# Patient Record
Sex: FEMALE | Race: WHITE | NOT HISPANIC OR LATINO | Employment: OTHER | URBAN - METROPOLITAN AREA
[De-identification: names, ages, dates, MRNs, and addresses within clinical notes are randomized per-mention and may not be internally consistent; named-entity substitution may affect disease eponyms.]

---

## 2022-05-23 ENCOUNTER — OFFICE VISIT (OUTPATIENT)
Dept: GASTROENTEROLOGY | Facility: CLINIC | Age: 57
End: 2022-05-23

## 2022-05-23 VITALS
BODY MASS INDEX: 38.48 KG/M2 | DIASTOLIC BLOOD PRESSURE: 87 MMHG | WEIGHT: 225.4 LBS | HEIGHT: 64 IN | SYSTOLIC BLOOD PRESSURE: 212 MMHG | TEMPERATURE: 97.3 F | HEART RATE: 81 BPM

## 2022-05-23 DIAGNOSIS — R19.7 DIARRHEA, UNSPECIFIED TYPE: ICD-10-CM

## 2022-05-23 DIAGNOSIS — K75.81 NASH (NONALCOHOLIC STEATOHEPATITIS): ICD-10-CM

## 2022-05-23 DIAGNOSIS — K74.60 CIRRHOSIS OF LIVER WITHOUT ASCITES, UNSPECIFIED HEPATIC CIRRHOSIS TYPE: Primary | ICD-10-CM

## 2022-05-23 PROCEDURE — 99204 OFFICE O/P NEW MOD 45 MIN: CPT | Performed by: NURSE PRACTITIONER

## 2022-05-23 RX ORDER — BLOOD SUGAR DIAGNOSTIC
STRIP MISCELLANEOUS
COMMUNITY
Start: 2022-03-21

## 2022-05-23 RX ORDER — ALBUTEROL SULFATE 90 UG/1
2 AEROSOL, METERED RESPIRATORY (INHALATION)
COMMUNITY
Start: 2022-01-12

## 2022-05-23 RX ORDER — LANCETS 33 GAUGE
EACH MISCELLANEOUS
COMMUNITY
Start: 2022-03-23

## 2022-05-23 RX ORDER — BLOOD-GLUCOSE METER
EACH MISCELLANEOUS 3 TIMES DAILY
COMMUNITY
Start: 2022-04-13

## 2022-05-23 RX ORDER — SITAGLIPTIN 100 MG/1
100 TABLET, FILM COATED ORAL DAILY
COMMUNITY
Start: 2022-02-19

## 2022-05-23 NOTE — PROGRESS NOTES
GASTROENTEROLOGY OFFICE NOTE  Paige Gastelum  [id number]  1965    CARE TEAM  Patient Care Team:  Edith Herrera APRN as PCP - General (Family Medicine)    Referring Provider: Edith Herrera APRN    Chief Complaint   Patient presents with   • Hepatic Disease        HISTORY OF PRESENT ILLNESS:  Ms. Gastelum is a 56-year-old female who presents today to establish care with gastroenterology/hepatology being seen previously at  for STILES and recently diagnosed with cirrhosis.    I reviewed her medical record available through Care Everywhere from  and it looks like in 2021 she was evaluated for elevated liver enzymes, serologic testing for viral, genetic and autoimmune diseases were negative.  An ultrasound from July 2020 shows hepatic steatosis.  At some point, FibroScan testing was done and indicated cirrhosis.  From there she went on to have an EGD in September 2021 to assess for varices, EGD report reads possible early grade 1 esophageal varix.    She has no history of ascites.  She has questionable evidence of hepatic encephalopathy, she reports that for the past several months she has had some problems with her memory specifically, she will be driving in not realize where she is or remember where she is going.  She reorients very quickly.  She has an upcoming appointment with neurology regarding this.    She has a history of diarrhea that seems to be mostly postprandial.  She typically has 5-7 loose, urgent bowel movements daily.  Colonoscopy at  in September 2021 was unremarkable with the exception of 2 polyps removed.    PAST MEDICAL HISTORY  Past Medical History:   Diagnosis Date   • Fatty liver    • GERD (gastroesophageal reflux disease)    • Liver disease         PAST SURGICAL HISTORY  Past Surgical History:   Procedure Laterality Date   • CHOLECYSTECTOMY     • HERNIA REPAIR  2014   • HYSTERECTOMY  2001   • OOPHORECTOMY          MEDICATIONS:    Current Outpatient Medications:  "  •  albuterol sulfate  (90 Base) MCG/ACT inhaler, Inhale 2 puffs., Disp: , Rfl:   •  Blood Glucose Monitoring Suppl (OneTouch Verio Flex System) w/Device kit, 3 (Three) Times a Day. as directed, Disp: , Rfl:   •  glucose blood (OneTouch Verio) test strip, Test Glucose once daily, Disp: , Rfl:   •  Januvia 100 MG tablet, Take 100 mg by mouth Daily., Disp: , Rfl:   •  Lancets (OneTouch Delica Plus [id number]) misc, USE TO CHECK GLUCOSE ONCE DAILY, Disp: , Rfl:   •  Pancrelipase, Lip-Prot-Amyl, (CREON) 17763-616079 units capsule delayed-release particles capsule, Two with meals, one with snacks, Disp: 180 capsule, Rfl: 5  •  riFAXIMin (Xifaxan) 550 MG tablet, Take 1 tablet by mouth Every 12 (Twelve) Hours., Disp: 60 tablet, Rfl: 11    ALLERGIES  Allergies   Allergen Reactions   • Topamax [Topiramate] Photosensitivity       FAMILY HISTORY:  Family History   Problem Relation Age of Onset   • Crohn's disease Paternal Grandmother    • Colon cancer Neg Hx    • Rectal cancer Neg Hx    • Stomach cancer Neg Hx        SOCIAL HISTORY  Social History     Socioeconomic History   • Marital status:    Tobacco Use   • Smoking status: Never Smoker   • Smokeless tobacco: Never Used   Vaping Use   • Vaping Use: Never used   Substance and Sexual Activity   • Alcohol use: Yes     Comment: SOCIAL   • Drug use: Never   • Sexual activity: Defer         PHYSICAL EXAM   BP (!) 212/87 (BP Location: Left arm, Patient Position: Sitting, Cuff Size: Adult)   Pulse 81   Temp 97.3 °F (36.3 °C) (Temporal)   Ht 162.6 cm (64\")   Wt 102 kg (225 lb 6.4 oz)   BMI 38.69 kg/m²   Physical Exam  Vitals and nursing note reviewed.   Constitutional:       Appearance: Normal appearance. She is well-developed.   HENT:      Head: Normocephalic and atraumatic.      Nose: Nose normal.      Mouth/Throat:      Mouth: Mucous membranes are moist.      Pharynx: Oropharynx is clear.   Eyes:      Pupils: Pupils are equal, round, and reactive to light. "   Cardiovascular:      Rate and Rhythm: Normal rate and regular rhythm.   Pulmonary:      Effort: Pulmonary effort is normal.      Breath sounds: Normal breath sounds. No wheezing or rales.   Abdominal:      General: Bowel sounds are normal.      Palpations: Abdomen is soft. There is no mass.      Tenderness: There is no abdominal tenderness. There is no guarding or rebound.      Hernia: No hernia is present.   Musculoskeletal:         General: Normal range of motion.      Cervical back: Normal range of motion and neck supple.   Skin:     General: Skin is warm and dry.   Neurological:      Mental Status: She is alert and oriented to person, place, and time.      Cranial Nerves: No cranial nerve deficit.   Psychiatric:         Behavior: Behavior normal.         Judgment: Judgment normal.           Results Review:  Jayna Galo MD - 05/04/2022   Formatting of this note might be different from the original.   Exam/Procedure: CT ABDOMEN PELVIS WO IV CONTRAST ordered by QUE RUIZ, [id number]     CLINICAL INDICATION:   Abdominal distension     TECHNIQUE:   Multiple axial CT images were obtained from lung bases through pubic symphysis without the administration of IV contrast. Reformatted images in the coronal and sagittal planes were generated from the axial data set to facilitate diagnostic accuracy.     Total DLP (Dose-Length Product): 743.74 mGy.cm. Please note: The reported value represents the total of one or more individual components during the CT acquisition on this date and at this time, and as such, the same value may appear in more than one CT report depending on the interpreting/reporting physicians.     COMPARISON:   10/20/2017     FINDINGS:   Lower Chest: Partially calcified nodule identified within the right lower lobe measuring 1.3 cm. There is partially calcified lymph nodes seen in the right infrahilar region. Findings all suggesting old healed granulomatous disease within the chest..      Analysis of the abdominopelvic viscera is limited by the absence of intravenous contrast material.     Solid Abdominal Organs: The liver is homogeneous in appearance. No change in the diffuse fatty infiltration. The gallbladder is been surgically removed. No intrahepatic biliary ductal dilatation. The pancreas is homogeneous in appearance. The spleen is unremarkable. Both adrenal glands are within normal limits. The renal parenchyma is unremarkable. No stones or distention seen of the renal collecting system.     GI Tract/Mesentery/Peritoneum: Small dystrophic calcification seen near the GE junction posteriorly measuring 1.3 cm. The stomach is unremarkable with no wall thickening. The small bowel is within normal limits. No mucosal abnormality. There is some stool seen scattered diffusely throughout the colon. No evidence of obvious obstruction. No obstructing lesion. No suspicious mesenteric or peritoneal abnormality..     Pelvic Viscera: The bladder is unremarkable. The uterus has been surgically removed. Some soft tissue thickening seen surrounding the right aspect of the vaginal cuff extending to the anterior aspect of the sacrum. Findings are stable and unchanged when compared to the prior examination.     Lymph Nodes/Vasculature: No adenopathy according to CT size criteria. Minimal vascular calcification seen within the abdominal aorta and pelvic vessels.     Free Fluid:None     Musculoskeletal and Body Wall:There is no evidence of ventral abdominal wall mass or defect. Some scarring seen in the midline of the subcutaneous tissues of the lower abdomen wall. The bony structures reveal no evidence of aggressive osseous abnormality. Minimal degenerative changes seen within the spine.     IMPRESSION:   No CT evidence of acute intra-abdominal or pelvic abnormality.      Component   Ref Range & Units 6 d ago    Protime   12.0 - 14.3 sec 13.8    INR   0.9 - 1.1 1.1    Resulting Agency [facility]      Component   Ref Range & Units 6 d ago   (5/17/22) 3 wk ago   (4/26/22) 3 mo ago   (2/7/22) 5 mo ago   (11/29/21) 8 mo ago   (9/16/21) 8 mo ago   (9/16/21) 8 mo ago   (9/9/21)   Glucose   74 - 99 mg/dL 144 High   144 High   143 High    102 High  CM  89 CM  107 High     BUN   7 - 21 mg/dL 13  13  16     13    Creatinine   0.60 - 1.10 mg/dL 0.67  0.70  0.67  0.7 R    0.63    BUN/Creatinine Ratio  19  19  24     21    Sodium   136 - 145 mmol/L 135 Low   144  138     141    Potassium   3.7 - 4.8 mmol/L 4.0  4.5 CM  4.1     4.3    Comment: Reference range for Serum potassium is 0.2 to 0.5 mmol/L higher than Plasma range.   Chloride   97 - 107 mmol/L 101  104  102     103    Total CO2   22 - 29 mmol/L 25  26  22     24    Anion Gap   6 - 16 mmol/L 9  14  14     14    Calcium   8.9 - 10.2 mg/dL 9.2  10.1  9.6     9.9    Total Protein   6.3 - 7.9 g/dL 7.1  7.4  7.8        Albumin   3.5 - 5.2 g/dL 4.1  4.7  4.4     4.6    AST (SGOT)   11 - 32 U/L 70 High   65 High   60 High         ALT (SGPT)   8 - 33 U/L 59 High   64 High   62 High         Alkaline Phosphatase   46 - 142 U/L 25 Low   31 Low   27 Low         Total Bilirubin   0.2 - 1.1 mg/dL 0.3  <0.2 Low   <0.2 Low         eGFR Non African Am   >60 mL/min/1.73m*2 >60  >60 CM  >60 CM  >60 R    >60 CM    Comment: eGFR = estimated GFR; eGFR units = mL/min/1.73 sq meters Chronic Kidney Disease is considered if eGFR <60 mL/min/1.73 sq meters Kidney failure is considered if eGFR is <15 mL/min/1.73 sq meters. eGFR assumes steady state plasma creatinine concentration; not applicable if renal function is rapidly changing or patient is on dialysis.   eGFR African Am   >60 mL/min/1.73m*2 >60  >60 CM  >60 CM  >60 R    >60 CM    Comment: eGFR = estimated GFR; eGFR units = mL/min/1.73 sq meters Chronic Kidney Disease is considered if eGFR <60 mL/min/1.73 sq meters Kidney failure is considered if eGFR is <15 mL/min/1.73 sq meters. eGFR assumes steady state plasma creatinine  concentration; not applicable if renal function is rapidly changing or patient is on dialysis.   Resulting Agency  [facility]  [facility]  [facility]  [facility]  [facility]  [facility]     Component   Ref Range & Units 6 d ago 3 wk ago 3 mo ago   WBC   3.70 - 10.30 10*3/uL 7.05  9.59  9.46    RBC   3.90 - 5.20 10*6/uL 4.63  4.95  4.73    Hemoglobin   11.2 - 15.7 g/dL 13.1  14.2  13.5    Hematocrit   34.0 - 45.0 % 40.0  43.1  40.2    Platelets    CM   CM    Comment: Platelet count not valid due to clumping. Appears decreased.   MCV   79 - 98 fL 86  87  85    MCH   26.0 - 32.0 pg 28.3  28.7  28.5    MCHC   30.7 - 35.5 g/dL 32.8  32.9  33.6    RDW   11.5 - 14.5 % 13.0  13.0  13.2    MPV    CM   CM    Comment: Not Measured not measured   nRBC   <=0.0 per 100 WBCs 0.0  0.0  0.0    Differential Type  Automated  Automated     Neutrophil Rel %   % % 52.0  54.0     Lymphocyte Rel %   % % 38.0  36.0     Monocyte Rel %   % % 6.0  6.0     Eosinophil %   % % 2.0  2.0     Basophil Rel %   % % 1.0  1.0     Immature Grans %   % % 1.0  1.0     Neutrophils Absolute   1.60 - 6.10 10*3/uL 3.74  5.30     Lymphocytes Absolute   1.20 - 3.90 10*3/uL 2.66  3.47     Monocytes Absolute   0.30 - 0.90 10*3/uL 0.41  0.58     Eosinophils Absolute   0.00 - 0.50 10*3/uL 0.15  0.14     Basophils Absolute   0.00 - 0.10 10*3/uL 0.05  0.05     Immature Grans, Absolute   0.00 - 0.06 10*3/uL 0.04  0.05     Resulting Agency  [facility]  [facility]         ASSESSMENT / PLAN  1.  STILES cirrhosis  MELDNa=7 (5/17/22 labs [facility])  2. ? Hepatic encephalopathy  3.  Postprandial diarrhea  - Weight loss of 10% with diet and exercise  - 2 g low-sodium diet  - Xifaxan 550 mg twice daily  - Creon 36,000 units 2 with meals 1 with snacks    Return in about 3 months (around 8/23/2022).    I discussed the patients findings and my recommendations with patient    Prashanth Keyes,  APRN

## 2023-06-05 ENCOUNTER — TRANSCRIBE ORDERS (OUTPATIENT)
Dept: ADMINISTRATIVE | Facility: HOSPITAL | Age: 58
End: 2023-06-05
Payer: MEDICARE

## 2023-06-05 DIAGNOSIS — R29.898 LEFT LEG WEAKNESS: ICD-10-CM

## 2023-06-05 DIAGNOSIS — R26.81 GAIT INSTABILITY: ICD-10-CM

## 2023-06-05 DIAGNOSIS — M79.662 PAIN OF LEFT LOWER LEG: Primary | ICD-10-CM

## 2023-08-01 ENCOUNTER — HOSPITAL ENCOUNTER (OUTPATIENT)
Dept: ULTRASOUND IMAGING | Facility: HOSPITAL | Age: 58
Discharge: HOME | End: 2023-08-01
Admitting: NURSE PRACTITIONER
Payer: MEDICARE

## 2023-08-01 DIAGNOSIS — K74.60 CIRRHOSIS OF LIVER WITHOUT ASCITES, UNSPECIFIED HEPATIC CIRRHOSIS TYPE: ICD-10-CM

## 2023-08-01 PROCEDURE — 76700 US EXAM ABDOM COMPLETE: CPT

## 2023-08-22 ENCOUNTER — HOSPITAL ENCOUNTER (OUTPATIENT)
Dept: MRI IMAGING | Facility: HOSPITAL | Age: 58
Discharge: HOME | End: 2023-08-22
Payer: MEDICARE

## 2023-08-22 DIAGNOSIS — R29.898 LEFT LEG WEAKNESS: ICD-10-CM

## 2023-08-22 DIAGNOSIS — R26.81 GAIT INSTABILITY: ICD-10-CM

## 2023-08-22 DIAGNOSIS — M79.662 PAIN OF LEFT LOWER LEG: ICD-10-CM

## 2023-08-22 PROCEDURE — 0 GADOBENATE DIMEGLUMINE 529 MG/ML SOLUTION: Performed by: NURSE PRACTITIONER

## 2023-08-22 PROCEDURE — 72158 MRI LUMBAR SPINE W/O & W/DYE: CPT

## 2023-08-22 PROCEDURE — A9577 INJ MULTIHANCE: HCPCS | Performed by: NURSE PRACTITIONER

## 2023-08-22 PROCEDURE — 72157 MRI CHEST SPINE W/O & W/DYE: CPT

## 2023-08-22 RX ADMIN — GADOBENATE DIMEGLUMINE 20 ML: 529 INJECTION, SOLUTION INTRAVENOUS at 16:26

## 2024-04-04 ENCOUNTER — TRANSCRIBE ORDERS (OUTPATIENT)
Dept: GENERAL RADIOLOGY | Facility: HOSPITAL | Age: 59
End: 2024-04-04
Payer: MEDICARE

## 2024-04-04 ENCOUNTER — HOSPITAL ENCOUNTER (OUTPATIENT)
Dept: GENERAL RADIOLOGY | Facility: HOSPITAL | Age: 59
Discharge: HOME | End: 2024-04-04
Admitting: NURSE PRACTITIONER
Payer: MEDICARE

## 2024-04-04 DIAGNOSIS — M54.9 DORSALGIA: Primary | ICD-10-CM

## 2024-04-04 DIAGNOSIS — M54.9 DORSALGIA: ICD-10-CM

## 2024-04-04 PROCEDURE — 71100 X-RAY EXAM RIBS UNI 2 VIEWS: CPT

## 2024-05-22 ENCOUNTER — TELEPHONE (OUTPATIENT)
Dept: GASTROENTEROLOGY | Facility: CLINIC | Age: 59
End: 2024-05-22
Payer: MEDICARE

## 2024-06-06 ENCOUNTER — TRANSCRIBE ORDERS (OUTPATIENT)
Dept: ADMINISTRATIVE | Facility: HOSPITAL | Age: 59
End: 2024-06-06
Payer: MEDICARE

## 2024-06-06 DIAGNOSIS — J84.10 LUNG GRANULOMA: Primary | ICD-10-CM

## 2024-06-07 ENCOUNTER — TRANSCRIBE ORDERS (OUTPATIENT)
Dept: ADMINISTRATIVE | Facility: HOSPITAL | Age: 59
End: 2024-06-07
Payer: MEDICARE

## 2024-06-07 DIAGNOSIS — J84.10 PULMONARY FIBROSIS: Primary | ICD-10-CM

## 2024-06-12 ENCOUNTER — TRANSCRIBE ORDERS (OUTPATIENT)
Dept: ADMINISTRATIVE | Facility: HOSPITAL | Age: 59
End: 2024-06-12
Payer: MEDICARE

## 2024-06-12 DIAGNOSIS — Z12.31 ENCOUNTER FOR SCREENING MAMMOGRAM FOR BREAST CANCER: Primary | ICD-10-CM

## 2024-06-13 ENCOUNTER — OUTSIDE FACILITY SERVICE (OUTPATIENT)
Dept: GASTROENTEROLOGY | Facility: CLINIC | Age: 59
End: 2024-06-13
Payer: MEDICARE

## 2024-06-13 PROCEDURE — 43239 EGD BIOPSY SINGLE/MULTIPLE: CPT | Performed by: INTERNAL MEDICINE

## 2024-06-15 ENCOUNTER — HOSPITAL ENCOUNTER (OUTPATIENT)
Dept: CT IMAGING | Facility: HOSPITAL | Age: 59
Discharge: HOME | End: 2024-06-15
Payer: MEDICARE

## 2024-06-15 DIAGNOSIS — J84.10 PULMONARY FIBROSIS: ICD-10-CM

## 2024-06-15 PROCEDURE — 71250 CT THORAX DX C-: CPT

## 2024-08-20 ENCOUNTER — TRANSCRIBE ORDERS (OUTPATIENT)
Dept: ADMINISTRATIVE | Facility: HOSPITAL | Age: 59
End: 2024-08-20
Payer: MEDICARE

## 2024-08-20 DIAGNOSIS — K74.60 CIRRHOSIS OF LIVER WITHOUT ASCITES, UNSPECIFIED HEPATIC CIRRHOSIS TYPE: Primary | ICD-10-CM

## 2024-09-12 ENCOUNTER — HOSPITAL ENCOUNTER (OUTPATIENT)
Dept: ULTRASOUND IMAGING | Facility: HOSPITAL | Age: 59
Discharge: HOME | End: 2024-09-12
Admitting: INTERNAL MEDICINE
Payer: MEDICARE

## 2024-09-12 DIAGNOSIS — K74.60 CIRRHOSIS OF LIVER WITHOUT ASCITES, UNSPECIFIED HEPATIC CIRRHOSIS TYPE: ICD-10-CM

## 2024-09-12 PROCEDURE — 76705 ECHO EXAM OF ABDOMEN: CPT

## 2024-12-23 ENCOUNTER — HOSPITAL ENCOUNTER (EMERGENCY)
Facility: HOSPITAL | Age: 59
Discharge: HOME | End: 2024-12-23
Attending: EMERGENCY MEDICINE | Admitting: EMERGENCY MEDICINE
Payer: MEDICARE

## 2024-12-23 VITALS
SYSTOLIC BLOOD PRESSURE: 144 MMHG | OXYGEN SATURATION: 100 % | BODY MASS INDEX: 31.9 KG/M2 | HEART RATE: 71 BPM | HEIGHT: 65 IN | RESPIRATION RATE: 20 BRPM | DIASTOLIC BLOOD PRESSURE: 94 MMHG | TEMPERATURE: 98 F | WEIGHT: 191.5 LBS

## 2024-12-23 DIAGNOSIS — K11.20 PAROTITIS: Primary | ICD-10-CM

## 2024-12-23 PROCEDURE — 99282 EMERGENCY DEPT VISIT SF MDM: CPT

## 2024-12-23 RX ORDER — CLINDAMYCIN HYDROCHLORIDE 300 MG/1
600 CAPSULE ORAL 3 TIMES DAILY
Qty: 42 CAPSULE | Refills: 0 | Status: SHIPPED | OUTPATIENT
Start: 2024-12-23 | End: 2024-12-30

## 2024-12-23 NOTE — DISCHARGE INSTRUCTIONS
Take antibiotic as prescribed for parotitis.  Close outpatient follow-up with ENT.  Call the office to schedule an appointment.  Please return to ED if worsening symptoms or concerns.
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
## 2025-01-06 ENCOUNTER — HOSPITAL ENCOUNTER (EMERGENCY)
Facility: HOSPITAL | Age: 60
Discharge: HOME | End: 2025-01-06
Attending: EMERGENCY MEDICINE | Admitting: EMERGENCY MEDICINE
Payer: MEDICARE

## 2025-01-06 VITALS
BODY MASS INDEX: 31.92 KG/M2 | TEMPERATURE: 97.9 F | RESPIRATION RATE: 18 BRPM | SYSTOLIC BLOOD PRESSURE: 160 MMHG | WEIGHT: 191.58 LBS | HEART RATE: 76 BPM | DIASTOLIC BLOOD PRESSURE: 97 MMHG | OXYGEN SATURATION: 100 % | HEIGHT: 65 IN

## 2025-01-06 DIAGNOSIS — M54.31 SCIATICA, RIGHT SIDE: Primary | ICD-10-CM

## 2025-01-06 PROCEDURE — 99283 EMERGENCY DEPT VISIT LOW MDM: CPT

## 2025-01-06 PROCEDURE — 25010000002 KETOROLAC TROMETHAMINE PER 15 MG: Performed by: PHYSICIAN ASSISTANT

## 2025-01-06 PROCEDURE — 25010000002 DEXAMETHASONE PER 1 MG: Performed by: PHYSICIAN ASSISTANT

## 2025-01-06 PROCEDURE — 96372 THER/PROPH/DIAG INJ SC/IM: CPT

## 2025-01-06 RX ORDER — KETOROLAC TROMETHAMINE 30 MG/ML
30 INJECTION, SOLUTION INTRAMUSCULAR; INTRAVENOUS ONCE
Status: DISCONTINUED | OUTPATIENT
Start: 2025-01-06 | End: 2025-01-06

## 2025-01-06 RX ORDER — DEXAMETHASONE SODIUM PHOSPHATE 10 MG/ML
10 INJECTION INTRAMUSCULAR; INTRAVENOUS ONCE
Status: COMPLETED | OUTPATIENT
Start: 2025-01-06 | End: 2025-01-06

## 2025-01-06 RX ORDER — LIDOCAINE 4 G/G
1 PATCH TOPICAL ONCE
Status: DISCONTINUED | OUTPATIENT
Start: 2025-01-06 | End: 2025-01-06 | Stop reason: HOSPADM

## 2025-01-06 RX ORDER — BACLOFEN 10 MG/1
10 TABLET ORAL ONCE
Status: COMPLETED | OUTPATIENT
Start: 2025-01-06 | End: 2025-01-06

## 2025-01-06 RX ORDER — BACLOFEN 10 MG/1
10 TABLET ORAL EVERY 8 HOURS PRN
Qty: 12 TABLET | Refills: 0 | Status: SHIPPED | OUTPATIENT
Start: 2025-01-06

## 2025-01-06 RX ORDER — KETOROLAC TROMETHAMINE 30 MG/ML
30 INJECTION, SOLUTION INTRAMUSCULAR; INTRAVENOUS ONCE
Status: COMPLETED | OUTPATIENT
Start: 2025-01-06 | End: 2025-01-06

## 2025-01-06 RX ADMIN — LIDOCAINE PAIN RELIEF 1 PATCH: 560 PATCH TOPICAL at 13:30

## 2025-01-06 RX ADMIN — DEXAMETHASONE SODIUM PHOSPHATE 10 MG: 10 INJECTION INTRAMUSCULAR; INTRAVENOUS at 12:22

## 2025-01-06 RX ADMIN — BACLOFEN 10 MG: 10 TABLET ORAL at 12:22

## 2025-01-06 RX ADMIN — KETOROLAC TROMETHAMINE 30 MG: 30 INJECTION, SOLUTION INTRAMUSCULAR at 12:25

## 2025-01-06 NOTE — FSED PROVIDER NOTE
Subjective  History of Present Illness:    59-year-old female PMH STILES, GERD, sciatica presents to ED for evaluation of low back pain.  Patient states that she developed some pain to her low back on Thursday, 4 days ago.  States that it initially was radiating down her left lower extremity however that seems to have resolved.  Now having the pain radiate down right lower extremity.  Very similar to previous episode of sciatica.  She denies any numbness, paresthesias, saddle anesthesias or paresthesias, loss of bowel or bladder.  No fevers, chills, urinary symptoms.  Notes that she has been putting away some holiday decor at home but no overt injury.  She was previously prescribed muscle relaxers and has had improvement in the past with these however she was unable to locate her prescription at home.  She has been taking Tylenol and Motrin without much relief.  Patient does report a history of previous spine surgery including fusion of L4-L5 with previous discectomy 20+ years ago.  Nurses Notes reviewed and agree, including vitals, allergies, social history and prior medical history.     REVIEW OF SYSTEMS: All systems reviewed and not pertinent unless noted.  Review of Systems   Musculoskeletal:  Positive for back pain.   All other systems reviewed and are negative.      Past Medical History:   Diagnosis Date    Fatty liver     GERD (gastroesophageal reflux disease)     Liver disease        Allergies:    Topamax [topiramate], Molds & smuts, Other, Yeast, Adhesive tape, Hydrocodone-acetaminophen, Ibuprofen, Codeine, Ondansetron, Promethazine, and Wound dressing adhesive      Past Surgical History:   Procedure Laterality Date    CHOLECYSTECTOMY      HERNIA REPAIR  2014    HYSTERECTOMY  2001    OOPHORECTOMY           Social History     Socioeconomic History    Marital status:    Tobacco Use    Smoking status: Never    Smokeless tobacco: Never   Vaping Use    Vaping status: Never Used   Substance and Sexual  "Activity    Alcohol use: Not Currently     Comment: SOCIAL    Drug use: Never    Sexual activity: Defer         Family History   Problem Relation Age of Onset    Crohn's disease Paternal Grandmother     Colon cancer Neg Hx     Rectal cancer Neg Hx     Stomach cancer Neg Hx        Objective  Physical Exam:  /97 (BP Location: Left arm, Patient Position: Sitting)   Pulse 76   Temp 97.9 °F (36.6 °C) (Oral)   Resp 18   Ht 165.1 cm (65\")   Wt 86.9 kg (191 lb 9.3 oz)   SpO2 100%   BMI 31.88 kg/m²      Physical Exam  Vitals and nursing note reviewed.   Constitutional:       General: She is not in acute distress.     Appearance: She is not toxic-appearing.   HENT:      Head: Normocephalic and atraumatic.   Cardiovascular:      Rate and Rhythm: Normal rate and regular rhythm.   Pulmonary:      Effort: Pulmonary effort is normal.      Breath sounds: Normal breath sounds.   Musculoskeletal:         General: Normal range of motion.      Cervical back: Normal.      Thoracic back: Normal.      Comments: No vertebral tenderness noted.  Patient with some mild pain reproduced with palpation of the posterior hip/right muscular low back.  Pain elicited with right leg raise.  No pain elicited with left leg raise.  Strength to bilateral lower extremities 5 out of 5.  No sensory deficit.  Extremities are neurovascularly intact.   Skin:     General: Skin is warm and dry.   Neurological:      General: No focal deficit present.      Mental Status: She is alert. Mental status is at baseline.      Sensory: No sensory deficit.      Motor: No weakness.   Psychiatric:         Mood and Affect: Mood normal.         Behavior: Behavior normal.         Procedures    ED Course:    ED Course as of 01/06/25 1324   Mon Jan 06, 2025   1317 Patient has had a prior discectomy.  Patient has back pain that radiates to the left lower extremity.  The pain in the left leg resolved but now she has right lower extremity pain.  The patient has no red " flags.  Patient has had symptomatic relief here. [HANNA]      ED Course User Index  [HANNA] Oniel Chamberlain MD       Lab Results (last 24 hours)       ** No results found for the last 24 hours. **             No radiology results from the last 24 hrs       MDM      Initial impression of presenting illness: 59-year-old female presents to ED for evaluation of back pain, history of sciatica and feels similar.  Patient has no red flag symptoms including any new onset focal weakness, numbness, paresthesias, saddle anesthesias or paresthesias, loss of bowel or bladder.  Please refer to HPI for further details.    DDX: includes but is not limited to: Muscular strain, sciatica afebrile    Patient arrives afebrile with stable vitals interpreted by myself.     Pertinent features from physical exam: No vertebral tenderness noted.  Patient with some mild pain reproduced with palpation of the posterior hip/right muscular low back.  Pain elicited with right leg raise.  No pain elicited with left leg raise.  Strength to bilateral lower extremities 5 out of 5.  No sensory deficit.  Extremities are neurovascularly intact.    Initial diagnostic plan: No indication for emergent imaging.      Interventions: Medications administered as below    Medications   Lidocaine 4 % 1 patch (has no administration in time range)   dexAMETHasone (DECADRON) injection 10 mg (10 mg Intramuscular Given 1/6/25 1222)   baclofen (LIORESAL) tablet 10 mg (10 mg Oral Given 1/6/25 1222)   ketorolac (TORADOL) injection 30 mg (30 mg Intramuscular Given 1/6/25 1225)       Reevaluation: Upon reevaluation patient reports improvement in pain following medication administered as above.  Will discharge home with prescription for baclofen and close outpatient follow-up with PCP.  Offered lidocaine patch prescription as well however she states that her insurance typically does not cover these so she declines.  Consultations/Discussion of results with other physicians: Case  discussed with attending, Dr. Chamberlain, who was agreeable with plan of care.      -----  ED Disposition       ED Disposition   Discharge    Condition   Stable    Comment   --             Final diagnoses:   Sciatica, right side      Your Follow-Up Providers       Schedule an appointment as soon as possible for a visit  with Sunil Bhardwaj APRN.    Specialty: Nurse Practitioner  [address]  [address]  [facility] [address]  [phone]               Go to  [facility] EMERGENCY DEPARTMENT [place].    Specialty: Emergency Medicine  Follow up details: If symptoms worsen  [address]  [facility] [address] [phone]                     Contact information for after-discharge care    Follow-up information has not been specified.                    Your medication list        START taking these medications        Instructions Last Dose Given Next Dose Due   baclofen 10 MG tablet  Commonly known as: LIORESAL      Take 1 tablet by mouth Every 8 (Eight) Hours As Needed for Muscle Spasms.              CONTINUE taking these medications        Instructions Last Dose Given Next Dose Due   albuterol sulfate  (90 Base) MCG/ACT inhaler  Commonly known as: PROVENTIL HFA;VENTOLIN HFA;PROAIR HFA      Inhale 2 puffs.       atorvastatin 40 MG tablet  Commonly known as: LIPITOR      Take 1 tablet by mouth every night at bedtime.       ipratropium 0.03 % nasal spray  Commonly known as: ATROVENT      2 sprays into the nostril(s) as directed by provider Every 12 (Twelve) Hours.       lisinopril 5 MG tablet  Commonly known as: PRINIVIL,ZESTRIL      Take 1 tablet by mouth Daily.       naproxen 375 MG tablet  Commonly known as: NAPROSYN           OneTouch Delica Plus [id number] misc      USE TO CHECK GLUCOSE ONCE DAILY       OneTouch Verio Flex System w/Device kit      3 (Three) Times a Day. as directed       OneTouch Verio test strip  Generic drug: glucose blood      Test Glucose once  daily       Pancrelipase (Lip-Prot-Amyl) 34986-592538 units capsule delayed-release particles capsule  Commonly known as: CREON      Two with meals, one with snacks       riFAXIMin 550 MG tablet  Commonly known as: Xifaxan      Take 1 tablet by mouth Every 12 (Twelve) Hours.       Semaglutide 14 MG tablet      Take 1 tablet by mouth.       sulindac 150 MG tablet  Commonly known as: CLINORIL      Take 1 tablet by mouth Daily. with food.       traZODone 50 MG tablet  Commonly known as: DESYREL      TAKE 1 TABLET BY MOUTH ONCE DAILY AT 9PM                 Where to Get Your Medications        These medications were sent to [facility] [address] [place] [address] - [address] - [phone] PH - [phone] FX  [address] [place] [address]      Phone: [phone]   baclofen 10 MG tablet

## 2025-01-06 NOTE — DISCHARGE INSTRUCTIONS
Take baclofen as prescribed.  Do not drive after taking as may make drowsy.  Alternate Motrin and Tylenol every 4-6 hours as needed for any pain while awake.  Close outpatient follow-up with your PCP for reevaluation within 2 to 3 days.  Please return to ED immediately for any worsening symptoms or concerns.

## 2025-02-25 ENCOUNTER — APPOINTMENT (OUTPATIENT)
Facility: HOSPITAL | Age: 60
End: 2025-02-25
Payer: MEDICARE

## 2025-02-25 ENCOUNTER — HOSPITAL ENCOUNTER (EMERGENCY)
Facility: HOSPITAL | Age: 60
Discharge: HOME | End: 2025-02-25
Attending: EMERGENCY MEDICINE | Admitting: EMERGENCY MEDICINE
Payer: MEDICARE

## 2025-02-25 VITALS
BODY MASS INDEX: 32.04 KG/M2 | SYSTOLIC BLOOD PRESSURE: 160 MMHG | TEMPERATURE: 97.3 F | DIASTOLIC BLOOD PRESSURE: 85 MMHG | RESPIRATION RATE: 17 BRPM | WEIGHT: 192.3 LBS | HEIGHT: 65 IN | OXYGEN SATURATION: 100 % | HEART RATE: 85 BPM

## 2025-02-25 DIAGNOSIS — M25.851 RIGHT HIP IMPINGEMENT SYNDROME: Primary | ICD-10-CM

## 2025-02-25 PROCEDURE — 73502 X-RAY EXAM HIP UNI 2-3 VIEWS: CPT

## 2025-02-25 PROCEDURE — 96372 THER/PROPH/DIAG INJ SC/IM: CPT

## 2025-02-25 PROCEDURE — 99283 EMERGENCY DEPT VISIT LOW MDM: CPT

## 2025-02-25 PROCEDURE — 72100 X-RAY EXAM L-S SPINE 2/3 VWS: CPT

## 2025-02-25 PROCEDURE — 25010000002 KETOROLAC TROMETHAMINE PER 15 MG: Performed by: EMERGENCY MEDICINE

## 2025-02-25 RX ORDER — KETOROLAC TROMETHAMINE 30 MG/ML
30 INJECTION, SOLUTION INTRAMUSCULAR; INTRAVENOUS ONCE
Status: COMPLETED | OUTPATIENT
Start: 2025-02-25 | End: 2025-02-25

## 2025-02-25 RX ORDER — METHOCARBAMOL 500 MG/1
750 TABLET, FILM COATED ORAL ONCE
Status: COMPLETED | OUTPATIENT
Start: 2025-02-25 | End: 2025-02-25

## 2025-02-25 RX ORDER — KETOROLAC TROMETHAMINE 10 MG/1
10 TABLET, FILM COATED ORAL EVERY 6 HOURS PRN
Qty: 10 TABLET | Refills: 0 | Status: SHIPPED | OUTPATIENT
Start: 2025-02-25

## 2025-02-25 RX ADMIN — METHOCARBAMOL TABLETS 750 MG: 500 TABLET, COATED ORAL at 07:28

## 2025-02-25 RX ADMIN — KETOROLAC TROMETHAMINE 30 MG: 30 INJECTION, SOLUTION INTRAMUSCULAR; INTRAVENOUS at 07:28

## 2025-02-25 NOTE — Clinical Note
[facility] EMERGENCY DEPARTMENT [place]  [address]  [facility] [address]  Phone: [phone]  Fax: [phone]    Paige Gastelum was seen and treated in our emergency department on 2/25/2025.  She may return to work on 03/01/2025.  May return sooner if feeling better.       Thank you for choosing [facility].    David Contreras, DO

## 2025-02-25 NOTE — FSED PROVIDER NOTE
"Subjective   History of Present Illness  Patient presents to the emergency department for right hip and back pain.  Says she bent over last night and started having pain in her right hip and low back.  Says it feels similar to sciatica she has had in the past though it does not involve her back as much.  Denies any change in bowel or bladder habit.  No direct trauma.    History provided by:  Patient   used: No        Review of Systems   Musculoskeletal:  Positive for back pain.   All other systems reviewed and are negative.      Past Medical History:   Diagnosis Date    Fatty liver     GERD (gastroesophageal reflux disease)     Liver disease        Allergies   Allergen Reactions    Topamax [Topiramate] Photosensitivity    Molds & Smuts Itching, Other (See Comments) and Unknown (See Comments)    Other Hives and Unknown (See Comments)     Per pt \"all antiemetics\" make her projectile vomit, Environmental, Tape adhesive    Yeast Itching and Other (See Comments)    Adhesive Tape Hives     Breaks out in hives and bumps    Hydrocodone-Acetaminophen Nausea Only    Ibuprofen Nausea Only    Codeine Other (See Comments)     nausea    Ondansetron Other (See Comments)     vomiting    Promethazine Other (See Comments)     N/V    Wound Dressing Adhesive Unknown (See Comments)       Past Surgical History:   Procedure Laterality Date    CHOLECYSTECTOMY      HERNIA REPAIR  2014    HYSTERECTOMY  2001    OOPHORECTOMY         Family History   Problem Relation Age of Onset    Crohn's disease Paternal Grandmother     Colon cancer Neg Hx     Rectal cancer Neg Hx     Stomach cancer Neg Hx        Social History     Socioeconomic History    Marital status:    Tobacco Use    Smoking status: Never    Smokeless tobacco: Never   Vaping Use    Vaping status: Never Used   Substance and Sexual Activity    Alcohol use: Not Currently     Comment: SOCIAL    Drug use: Never    Sexual activity: Defer           Objective "   Physical Exam  Vitals and nursing note reviewed.   Constitutional:       Appearance: Normal appearance.   HENT:      Head: Normocephalic and atraumatic.      Nose: Nose normal. No congestion.      Mouth/Throat:      Mouth: Mucous membranes are moist.      Pharynx: Oropharynx is clear.   Eyes:      Extraocular Movements: Extraocular movements intact.      Conjunctiva/sclera: Conjunctivae normal.      Pupils: Pupils are equal, round, and reactive to light.   Cardiovascular:      Rate and Rhythm: Normal rate.      Pulses: Normal pulses.      Heart sounds: Normal heart sounds.   Pulmonary:      Effort: Pulmonary effort is normal. No respiratory distress.      Breath sounds: Normal breath sounds. No wheezing.   Abdominal:      General: There is no distension.      Palpations: Abdomen is soft.      Tenderness: There is no abdominal tenderness. There is no guarding or rebound.   Musculoskeletal:         General: No swelling, tenderness, deformity or signs of injury. Normal range of motion.      Cervical back: Normal range of motion and neck supple.      Lumbar back: No bony tenderness. Positive right straight leg raise test.   Skin:     General: Skin is warm and dry.      Capillary Refill: Capillary refill takes less than 2 seconds.   Neurological:      General: No focal deficit present.      Mental Status: She is alert and oriented to person, place, and time.      Cranial Nerves: No cranial nerve deficit.      Motor: No weakness.   Psychiatric:         Mood and Affect: Mood normal.         Behavior: Behavior normal.         Procedures           ED Course  ED Course as of 02/25/25 0844   Tue Feb 25, 2025   0824 XR Spine Lumbar 2 or 3 View [DM]      ED Course User Index  [DM] David Contreras, DO                                           Medical Decision Making  Care of patient.  Patient given 30 IM Toradol p.o. Robaxin x-ray of the lumbar spine and right hip show chronic changes nothing acute patient given orthopedic  follow-up patient feeling better upon reassessment discharged well-appearing less likely hip impingement syndrome.    Problems Addressed:  Right hip impingement syndrome: complicated acute illness or injury    Amount and/or Complexity of Data Reviewed  Radiology: ordered. Decision-making details documented in ED Course.    Risk  Prescription drug management.        Final diagnoses:   Right hip impingement syndrome       ED Disposition  ED Disposition       ED Disposition   Discharge    Condition   Stable    Comment   --               [facility] ORTHOPEDICS & SPORTS MEDICINE  [address]  [facility] [address] [phone]             Medication List        New Prescriptions      ketorolac 10 MG tablet  Commonly known as: TORADOL  Take 1 tablet by mouth Every 6 (Six) Hours As Needed for Moderate Pain.               Where to Get Your Medications        These medications were sent to [facility] [address] [place] [address] - [address] - [phone]  - [phone]   [address] [place] [address]      Phone: [phone]   ketorolac 10 MG tablet

## 2025-03-06 ENCOUNTER — OFFICE VISIT (OUTPATIENT)
Dept: ORTHOPEDIC SURGERY | Facility: CLINIC | Age: 60
End: 2025-03-06
Payer: MEDICARE

## 2025-03-06 VITALS
SYSTOLIC BLOOD PRESSURE: 130 MMHG | BODY MASS INDEX: 32.03 KG/M2 | WEIGHT: 192.24 LBS | HEIGHT: 65 IN | DIASTOLIC BLOOD PRESSURE: 88 MMHG

## 2025-03-06 DIAGNOSIS — M54.16 RADICULOPATHY, LUMBAR REGION: ICD-10-CM

## 2025-03-06 DIAGNOSIS — M70.61 TROCHANTERIC BURSITIS OF RIGHT HIP: Primary | ICD-10-CM

## 2025-03-06 DIAGNOSIS — M47.816 LUMBAR SPONDYLOSIS: ICD-10-CM

## 2025-03-06 RX ORDER — LIDOCAINE 50 MG/G
1 PATCH TOPICAL DAILY
COMMUNITY
Start: 2024-12-22

## 2025-03-06 RX ORDER — ACETAMINOPHEN 500 MG
1000 TABLET ORAL EVERY 6 HOURS PRN
COMMUNITY

## 2025-03-06 RX ORDER — LIDOCAINE HYDROCHLORIDE 10 MG/ML
3 INJECTION, SOLUTION EPIDURAL; INFILTRATION; INTRACAUDAL; PERINEURAL
Status: COMPLETED | OUTPATIENT
Start: 2025-03-06 | End: 2025-03-06

## 2025-03-06 RX ORDER — TRIAMCINOLONE ACETONIDE 40 MG/ML
80 INJECTION, SUSPENSION INTRA-ARTICULAR; INTRAMUSCULAR
Status: COMPLETED | OUTPATIENT
Start: 2025-03-06 | End: 2025-03-06

## 2025-03-06 RX ORDER — METHOCARBAMOL 500 MG/1
TABLET, FILM COATED ORAL
COMMUNITY
Start: 2025-01-16

## 2025-03-06 RX ORDER — BUPIVACAINE HYDROCHLORIDE 2.5 MG/ML
3 INJECTION, SOLUTION EPIDURAL; INFILTRATION; INTRACAUDAL
Status: COMPLETED | OUTPATIENT
Start: 2025-03-06 | End: 2025-03-06

## 2025-03-06 RX ADMIN — LIDOCAINE HYDROCHLORIDE 3 ML: 10 INJECTION, SOLUTION EPIDURAL; INFILTRATION; INTRACAUDAL; PERINEURAL at 15:21

## 2025-03-06 RX ADMIN — BUPIVACAINE HYDROCHLORIDE 3 ML: 2.5 INJECTION, SOLUTION EPIDURAL; INFILTRATION; INTRACAUDAL at 15:21

## 2025-03-06 RX ADMIN — TRIAMCINOLONE ACETONIDE 80 MG: 40 INJECTION, SUSPENSION INTRA-ARTICULAR; INTRAMUSCULAR at 15:21

## 2025-03-06 NOTE — PROGRESS NOTES
Procedure   - Large Joint Arthrocentesis: R greater trochanteric bursa on 3/6/2025 3:21 PM  Indications: pain  Details: 22 G needle, lateral approach  Medications: 3 mL lidocaine PF 1% 1 %; 3 mL bupivacaine (PF) 0.25 %; 80 mg triamcinolone acetonide 40 MG/ML  Outcome: tolerated well, no immediate complications  Procedure, treatment alternatives, risks and benefits explained, specific risks discussed. Consent was given by the patient. Immediately prior to procedure a time out was called to verify the correct patient, procedure, equipment, support staff and site/side marked as required. Patient was prepped and draped in the usual sterile fashion.

## 2025-03-06 NOTE — PROGRESS NOTES
Orthopaedic Clinic Note: Hip New Patient    Chief Complaint   Patient presents with    Right Hip - Pain        HPI    Paige Gastelum is a 59 y.o. female who presents with right hip pain for 10 year(s). Onset atraumatic and gradual in nature. Pain is localized to lateral trochanter and gluteal region and is a 10/10 on the pain scale.Pain is described as aching. Associated symptoms include pain, stiffness, and giving way/buckling.  The pain is worse with walking, standing, sitting, climbing stairs, sleeping, working, lying on affected side, and rising from seated position; sitting and resting improve the pain. Previous treatments have included: NSAIDS and physical therapy since symptom onset. Although some transient relief was reported with these interventions, these conservative measures have failed and symptoms have persisted. The patient is limited in daily activities and has had a significant decrease in quality of life as a result. She denies fevers, chills, or constitutional symptoms.    I have reviewed the following portions of the patient's history:History of Present Illness    Past Medical History:   Diagnosis Date    Fatty liver     GERD (gastroesophageal reflux disease)     Liver disease       Past Surgical History:   Procedure Laterality Date    CHOLECYSTECTOMY      HERNIA REPAIR  2014    HYSTERECTOMY  2001    OOPHORECTOMY        Family History   Problem Relation Age of Onset    Crohn's disease Paternal Grandmother     Colon cancer Neg Hx     Rectal cancer Neg Hx     Stomach cancer Neg Hx      Social History     Socioeconomic History    Marital status:    Tobacco Use    Smoking status: Never    Smokeless tobacco: Never   Vaping Use    Vaping status: Never Used   Substance and Sexual Activity    Alcohol use: Not Currently     Comment: SOCIAL    Drug use: Never    Sexual activity: Defer      Current Outpatient Medications on File Prior to Visit   Medication Sig Dispense Refill    acetaminophen  (TYLENOL) 500 MG tablet Take 2 tablets by mouth Every 6 (Six) Hours As Needed.      baclofen (LIORESAL) 10 MG tablet Take 1 tablet by mouth Every 8 (Eight) Hours As Needed for Muscle Spasms. 12 tablet 0    Blood Glucose Monitoring Suppl (OneTouch Verio Flex System) w/Device kit 3 (Three) Times a Day. as directed      glucose blood (OneTouch Verio) test strip Test Glucose once daily      Lancets (OneTouch Delica Plus [id number]) misc USE TO CHECK GLUCOSE ONCE DAILY      lidocaine (LIDODERM) 5 % Apply 1 patch topically to the appropriate area as directed Daily.      methocarbamol (ROBAXIN) 500 MG tablet       riFAXIMin (Xifaxan) 550 MG tablet Take 1 tablet by mouth Every 12 (Twelve) Hours. 60 tablet 11    Semaglutide 14 MG tablet Take 1 tablet by mouth.      [DISCONTINUED] albuterol sulfate  (90 Base) MCG/ACT inhaler Inhale 2 puffs. (Patient not taking: Reported on 7/7/2023)      [DISCONTINUED] atorvastatin (LIPITOR) 40 MG tablet Take 1 tablet by mouth every night at bedtime. (Patient not taking: Reported on 7/7/2023)      [DISCONTINUED] ipratropium (ATROVENT) 0.03 % nasal spray 2 sprays into the nostril(s) as directed by provider Every 12 (Twelve) Hours. (Patient not taking: Reported on 7/7/2023) 120 mL 11    [DISCONTINUED] ketorolac (TORADOL) 10 MG tablet Take 1 tablet by mouth Every 6 (Six) Hours As Needed for Moderate Pain. 10 tablet 0    [DISCONTINUED] lisinopril (PRINIVIL,ZESTRIL) 5 MG tablet Take 1 tablet by mouth Daily. (Patient not taking: Reported on 7/7/2023)      [DISCONTINUED] naproxen (NAPROSYN) 375 MG tablet  (Patient not taking: Reported on 7/7/2023)      [DISCONTINUED] Pancrelipase, Lip-Prot-Amyl, (CREON) 27832-718658 units capsule delayed-release particles capsule Two with meals, one with snacks (Patient not taking: Reported on 7/7/2023) 180 capsule 5    [DISCONTINUED] sulindac (CLINORIL) 150 MG tablet Take 1 tablet by mouth Daily. with food. (Patient not taking: Reported on 7/7/2023)       "[DISCONTINUED] traZODone (DESYREL) 50 MG tablet TAKE 1 TABLET BY MOUTH ONCE DAILY AT 9PM (Patient not taking: Reported on 7/7/2023)       No current facility-administered medications on file prior to visit.      Allergies   Allergen Reactions    Topiramate Photosensitivity, Other (See Comments) and Swelling     Eyes swelling    Molds & Smuts Itching, Other (See Comments) and Unknown (See Comments)    Other Hives and Unknown (See Comments)     Per pt \"all antiemetics\" make her projectile vomit, Environmental, Tape adhesive    Yeast Itching and Other (See Comments)    Adhesive Tape Hives     Breaks out in hives and bumps    Hydrocodone-Acetaminophen Nausea Only    Ibuprofen Nausea Only    Codeine Other (See Comments)     nausea    Ondansetron Other (See Comments)     vomiting    Promethazine Other (See Comments)     N/V    Wound Dressing Adhesive Unknown (See Comments)        Review of Systems   Constitutional: Negative.    HENT: Negative.     Eyes: Negative.    Respiratory: Negative.     Cardiovascular: Negative.    Gastrointestinal: Negative.    Endocrine: Negative.    Genitourinary: Negative.    Musculoskeletal:  Positive for arthralgias.   Skin: Negative.    Allergic/Immunologic: Negative.    Neurological: Negative.    Hematological: Negative.    Psychiatric/Behavioral: Negative.          The patient's Review of Systems was personally reviewed and confirmed as accurate.    The following portions of the patient's history were reviewed and updated as appropriate: allergies, current medications, past family history, past medical history, past social history, past surgical history, and problem list.    Physical Exam  Blood pressure 130/88, height 165.1 cm (65\"), weight 87.2 kg (192 lb 3.9 oz).    Body mass index is 31.99 kg/m².    GENERAL APPEARANCE: awake, alert & oriented x 3, in no acute distress and well developed, well nourished  PSYCH: normal affect  LUNGS:  breathing nonlabored  EYES: sclera " anicteric  CARDIOVASCULAR: palpable dorsalis pedis, palpable posterior tibial bilaterally. Capillary refill less than 2 seconds  EXTREMITIES: no clubbing, cyanosis  GAIT:  Antalgic           Right Hip Exam:  RANGE OF MOTION:   FLEXION CONTRACTURE: None   FLEXION: 110 degrees   INTERNAL ROTATION: 20 degrees at 90 degrees of flexion   EXTERNAL ROTATION: 40 degrees at 90 degrees of flexion    PAIN WITH HIP MOTION: no  PAIN WITH LOGROLL: no  STINCHFIELD TEST: negative    KNEE EXAM: full knee ROM (0-120 degrees), stable to varus and valgus stress at terminal extension and 30 degrees flexion     STRENGTH:  5/5 hip adduction, abduction, flexion. 5/5 strength knee flexion, extension. 5/5 strength ankle dorsiflexion and plantarflexion.     GREATER TROCHANTER BURSAL PAIN:  yes     REFLEXES:   PATELLAR 2+/4   ACHILLES 2+/4    CLONUS: negative  STRAIGHT LEG TEST:   negative    SENSATION TO LIGHT TOUCH:  DEEP PERONEAL/SUPERFICIAL PERONEAL/SURAL/SAPHENOUS/TIBIAL:  intact except for radicular pain in the L5 distribution    EDEMA:   no  ERYTHEMA:  no  WOUNDS/INCISIONS: no overlying skin problems.      Left Hip Exam:   RANGE OF MOTION:   FLEXION CONTRACTURE: None   FLEXION: 110 degrees   INTERNAL ROTATION: 20 degrees at 90 degrees of flexion   EXTERNAL ROTATION: 40 degrees at 90 degrees of flexion    PAIN WITH HIP MOTION: no  PAIN WITH LOGROLL: no  STINCHFIELD TEST: negative    KNEE EXAM: full knee ROM (0-120 degrees), stable to varus and valgus stress at terminal extension and 30 degrees flexion     STRENGTH:  5/5 hip adduction, abduction, flexion. 5/5 strength knee flexion, extension. 5/5 strength ankle dorsiflexion and plantarflexion.     GREATER TROCHANTER BURSAL PAIN:  no     REFLEXES:   PATELLAR 2+/4   ACHILLES 2+/4    CLONUS: negative  STRAIGHT LEG TEST:   negative    SENSATION TO LIGHT TOUCH:  DEEP PERONEAL/SUPERFICIAL PERONEAL/SURAL/SAPHENOUS/TIBIAL:  intact    EDEMA:   no  ERYTHEMA:  no  WOUNDS/INCISIONS: no overlying skin  problems.      ------------------------------------------------------------------    LEG LENGTHS:  equal  _____________________________________________________  _____________________________________________________    RADIOGRAPHIC FINDINGS:   AP pelvis and right hip radiographs from 2/25/2025 were personally reviewed and interpreted.  Radiographs demonstrate mild arthritic changes with minimal joint space narrowing or osteophyte formation.  There does appear to be some calcification along the margin of the superolateral acetabulum indicative of possible labral calcification versus os.    Assessment/Plan:   Diagnosis Plan   1. Trochanteric bursitis of right hip        2. Lumbar spondylosis        3. Radiculopathy, lumbar region          Patient has minimally symptomatic hip range of motion with x-ray showing mild arthritis of the hip.  She does have some clinical bursitis at the trochanteric region.  As discussed cortisone injection of the trochanteric bursa.  She is agreeable to that.  She is also demonstrating lumbar radiculopathy with pain radiating all the way down to her ankle.  I explained that this is likely coming from her lumbar spine and not the hip joint as hip joint pain rarely if ever extends past the knee.  Given her benign hip exam today, I do not believe the hip joint is the source of her pain and I believe her lumbar spondylosis and radiculopathy is likely the underlying problem.  I recommended that she see a spine specialist for further evaluation.  I will defer this to her primary care.  She is agreeable to trochanteric bursa cortisone injection today.  She will follow-up as needed.    Procedure Note:  I discussed with the patient the potential benefits of performing a therapeutic injection of the right hip trochanteric bursa as well as potential risks including but not limited to infection, swelling, pain, bleeding, bruising, nerve/vessel damage, skin color changes, transient elevation in blood  glucose levels, and fat atrophy. After informed consent and verifying correct patient, procedure site, and type of procedure, the area was prepped with alcohol, ethyl chloride was used to numb the skin. Via the direct lateral approach, 3 cc of 1% lidocaine, 3 cc of 0.25% Marcaine and 2 cc of 40mg/ml of Kenalog were injected into the right hip trochanteric bursa. The patient tolerated the procedure well. There were no complications. A sterile dressing was placed over the injection site.      Tj Ricks MD  03/06/25  15:24 EST